# Patient Record
Sex: FEMALE | Race: WHITE | Employment: OTHER | URBAN - METROPOLITAN AREA
[De-identification: names, ages, dates, MRNs, and addresses within clinical notes are randomized per-mention and may not be internally consistent; named-entity substitution may affect disease eponyms.]

---

## 2018-01-12 NOTE — PROCEDURE NOTE: SURGICAL
"<p>Prior to commencing surgery patient identification, surgical procedure, site, and side were confirmed by Dr. Moreno Pierre. Following topical proparacaine anesthesia, the patient was positioned at the YAG laser, a contact lens coupled to the cornea of the right eye with methylcellulose and an axial posterior capsulotomy performed without complication using 3.2 Mj x 23.&nbsp;&nbsp;<span style=""text-align:justify;"">Attention was then turned to the left eye and a contact lens coupled to the cornea of the left eye with methylcellulose and an axial posterior capsulotomy performed without complication using 3.3 Mj x 24.  Excess methylcellulose was washed from the eye

## 2018-04-17 ENCOUNTER — CATARACT CONSULT (OUTPATIENT)
Dept: URBAN - METROPOLITAN AREA CLINIC 43 | Facility: CLINIC | Age: 68
End: 2018-04-17

## 2018-04-17 VITALS
SYSTOLIC BLOOD PRESSURE: 188 MMHG | HEART RATE: 72 BPM | DIASTOLIC BLOOD PRESSURE: 110 MMHG | HEIGHT: 55 IN | RESPIRATION RATE: 14 BRPM

## 2018-04-17 DIAGNOSIS — H35.30: ICD-10-CM

## 2018-04-17 DIAGNOSIS — E11.9: ICD-10-CM

## 2018-04-17 DIAGNOSIS — H25.813: ICD-10-CM

## 2018-04-17 DIAGNOSIS — H04.123: ICD-10-CM

## 2018-04-17 PROCEDURE — 92015 DETERMINE REFRACTIVE STATE: CPT

## 2018-04-17 PROCEDURE — 92004 COMPRE OPH EXAM NEW PT 1/>: CPT

## 2018-04-17 PROCEDURE — 92134 CPTRZ OPH DX IMG PST SGM RTA: CPT

## 2018-04-17 PROCEDURE — 92136TC INTERFEROMETRY - TECHNICAL COMPONENT

## 2018-04-17 ASSESSMENT — TONOMETRY
OD_IOP_MMHG: 18
OS_IOP_MMHG: 18

## 2018-04-17 ASSESSMENT — VISUAL ACUITY
OD_SC: 20/25+2
OD_BAT: 20/50
OD_SC: J6
OS_CC: J1
OS_SC: J6
OS_BAT: 20/50
OS_SC: 20/25
OD_CC: J1

## 2019-09-25 ENCOUNTER — ESTABLISHED COMPREHENSIVE EXAM (OUTPATIENT)
Dept: URBAN - METROPOLITAN AREA CLINIC 43 | Facility: CLINIC | Age: 69
End: 2019-09-25

## 2019-09-25 DIAGNOSIS — H35.30: ICD-10-CM

## 2019-09-25 DIAGNOSIS — H25.813: ICD-10-CM

## 2019-09-25 DIAGNOSIS — H04.123: ICD-10-CM

## 2019-09-25 DIAGNOSIS — E11.9: ICD-10-CM

## 2019-09-25 PROCEDURE — 92134 CPTRZ OPH DX IMG PST SGM RTA: CPT

## 2019-09-25 PROCEDURE — 92014 COMPRE OPH EXAM EST PT 1/>: CPT

## 2019-09-25 PROCEDURE — 92015 DETERMINE REFRACTIVE STATE: CPT

## 2019-09-25 ASSESSMENT — VISUAL ACUITY
OD_SC: J8
OD_CC: J1
OS_BAT: <20/400
OD_SC: 20/25+1
OD_BAT: <20/400
OS_CC: J1
OS_SC: J6
OS_SC: 20/20

## 2019-09-25 ASSESSMENT — TONOMETRY
OS_IOP_MMHG: 19
OD_IOP_MMHG: 19

## 2020-11-03 ENCOUNTER — ESTABLISHED PATIENT (OUTPATIENT)
Dept: URBAN - METROPOLITAN AREA CLINIC 43 | Facility: CLINIC | Age: 70
End: 2020-11-03

## 2020-11-03 DIAGNOSIS — E11.9: ICD-10-CM

## 2020-11-03 DIAGNOSIS — H25.813: ICD-10-CM

## 2020-11-03 DIAGNOSIS — H04.123: ICD-10-CM

## 2020-11-03 DIAGNOSIS — H35.30: ICD-10-CM

## 2020-11-03 PROCEDURE — 92015 DETERMINE REFRACTIVE STATE: CPT

## 2020-11-03 PROCEDURE — 92134 CPTRZ OPH DX IMG PST SGM RTA: CPT

## 2020-11-03 PROCEDURE — 99214 OFFICE O/P EST MOD 30 MIN: CPT

## 2020-11-03 ASSESSMENT — TONOMETRY
OD_IOP_MMHG: 20
OS_IOP_MMHG: 18

## 2020-11-03 ASSESSMENT — VISUAL ACUITY
OD_BAT: 20/50
OD_SC: 20/25
OD_RAM: 20/20
OS_CC: J1
OD_CC: J1
OD_SC: J8
OS_SC: J8
OS_BAT: 20/50
OS_SC: 20/40+2

## 2022-01-12 ENCOUNTER — CONSULTATION/EVALUATION (OUTPATIENT)
Dept: URBAN - METROPOLITAN AREA CLINIC 43 | Facility: CLINIC | Age: 72
End: 2022-01-12

## 2022-01-12 DIAGNOSIS — H25.812: ICD-10-CM

## 2022-01-12 DIAGNOSIS — H35.30: ICD-10-CM

## 2022-01-12 DIAGNOSIS — H25.811: ICD-10-CM

## 2022-01-12 DIAGNOSIS — E11.9: ICD-10-CM

## 2022-01-12 DIAGNOSIS — H04.123: ICD-10-CM

## 2022-01-12 PROCEDURE — 99213 OFFICE O/P EST LOW 20 MIN: CPT

## 2022-01-12 PROCEDURE — 92134 CPTRZ OPH DX IMG PST SGM RTA: CPT

## 2022-01-12 PROCEDURE — 92015 DETERMINE REFRACTIVE STATE: CPT

## 2022-01-12 ASSESSMENT — VISUAL ACUITY
OS_BAT: 20/50
OS_CC: J1
OD_SC: J8
OD_CC: J2
OD_SC: 20/30
OS_AM: 20/20
OS_SC: 20/30
OD_BAT: 20/50
OS_SC: J6
OD_RAM: 20/20

## 2022-01-12 ASSESSMENT — TONOMETRY
OD_IOP_MMHG: 16
OS_IOP_MMHG: 17

## 2022-05-21 NOTE — PATIENT DISCUSSION
Lab Results   Component Value Date    HGBA1C 6 5 (H) 03/30/2022       Recent Labs     05/20/22  1506 05/20/22  1800 05/20/22  2127 05/21/22  1059   POCGLU 131 130 138 212*       Blood Sugar Average: Last 72 hrs:  · (P) 160     · Metformin on hold while inpatient  · Continue insulin therapy  · Titrate insulin dose based on Accu-Cheks  · Avoid hypoglycemia  · Hypoglycemia protocol in place Recommended against surgery at this time given that patient has PCO and that may be contributing more to blurry vision than the Pucker.

## 2023-01-27 ENCOUNTER — COMPREHENSIVE EXAM (OUTPATIENT)
Dept: URBAN - METROPOLITAN AREA CLINIC 43 | Facility: CLINIC | Age: 73
End: 2023-01-27

## 2023-01-27 DIAGNOSIS — H52.4: ICD-10-CM

## 2023-01-27 DIAGNOSIS — H35.30: ICD-10-CM

## 2023-01-27 DIAGNOSIS — H04.123: ICD-10-CM

## 2023-01-27 DIAGNOSIS — E11.9: ICD-10-CM

## 2023-01-27 DIAGNOSIS — H25.813: ICD-10-CM

## 2023-01-27 PROCEDURE — 92015 DETERMINE REFRACTIVE STATE: CPT

## 2023-01-27 PROCEDURE — 92014 COMPRE OPH EXAM EST PT 1/>: CPT

## 2023-01-27 ASSESSMENT — VISUAL ACUITY
OD_SC: 20/25
OU_CC: J1
OD_CC: J1
OD_SC: J8
OS_SC: J6
OU_SC: J4
OU_SC: 20/20-2
OS_SC: 20/25-2
OS_CC: J1

## 2023-01-27 ASSESSMENT — TONOMETRY
OS_IOP_MMHG: 20
OD_IOP_MMHG: 20

## 2024-01-29 ENCOUNTER — COMPREHENSIVE EXAM (OUTPATIENT)
Dept: URBAN - METROPOLITAN AREA CLINIC 43 | Facility: CLINIC | Age: 74
End: 2024-01-29

## 2024-01-29 DIAGNOSIS — H52.4: ICD-10-CM

## 2024-01-29 DIAGNOSIS — E11.9: ICD-10-CM

## 2024-01-29 DIAGNOSIS — Z01.00: ICD-10-CM

## 2024-01-29 PROCEDURE — 92014 COMPRE OPH EXAM EST PT 1/>: CPT

## 2024-01-29 PROCEDURE — 92015 DETERMINE REFRACTIVE STATE: CPT

## 2024-01-29 ASSESSMENT — VISUAL ACUITY
OD_SC: 20/20
OU_SC: J6
OU_SC: 20/20
OS_SC: J6
OD_CC: J1
OD_SC: J8+
OS_SC: 20/20-1
OU_CC: J1
OS_CC: J1

## 2024-01-29 ASSESSMENT — TONOMETRY
OD_IOP_MMHG: 15
OS_IOP_MMHG: 16

## 2025-01-28 ENCOUNTER — COMPREHENSIVE EXAM (OUTPATIENT)
Age: 75
End: 2025-01-28

## 2025-01-28 DIAGNOSIS — Z01.00: ICD-10-CM

## 2025-01-28 DIAGNOSIS — H52.4: ICD-10-CM

## 2025-01-28 DIAGNOSIS — E11.9: ICD-10-CM

## 2025-01-28 PROCEDURE — 92015 DETERMINE REFRACTIVE STATE: CPT

## 2025-01-28 PROCEDURE — 92014 COMPRE OPH EXAM EST PT 1/>: CPT

## 2025-04-02 ENCOUNTER — APPOINTMENT (OUTPATIENT)
Dept: URBAN - METROPOLITAN AREA CLINIC 150 | Facility: CLINIC | Age: 75
Setting detail: DERMATOLOGY
End: 2025-04-02

## 2025-04-02 DIAGNOSIS — L82.1 OTHER SEBORRHEIC KERATOSIS: ICD-10-CM | Status: STABLE

## 2025-04-02 DIAGNOSIS — L57.0 ACTINIC KERATOSIS: ICD-10-CM | Status: INADEQUATELY CONTROLLED

## 2025-04-02 DIAGNOSIS — L81.4 OTHER MELANIN HYPERPIGMENTATION: ICD-10-CM | Status: STABLE

## 2025-04-02 PROCEDURE — ? COUNSELING

## 2025-04-02 PROCEDURE — ? TREATMENT REGIMEN

## 2025-04-02 PROCEDURE — 17000 DESTRUCT PREMALG LESION: CPT

## 2025-04-02 PROCEDURE — 99203 OFFICE O/P NEW LOW 30 MIN: CPT | Mod: 25

## 2025-04-02 PROCEDURE — ? LIQUID NITROGEN

## 2025-04-02 ASSESSMENT — LOCATION SIMPLE DESCRIPTION DERM: LOCATION SIMPLE: LEFT CHEEK

## 2025-04-02 ASSESSMENT — LOCATION ZONE DERM: LOCATION ZONE: FACE

## 2025-04-02 ASSESSMENT — LOCATION DETAILED DESCRIPTION DERM
LOCATION DETAILED: LEFT INFERIOR CENTRAL MALAR CHEEK
LOCATION DETAILED: LEFT SUPERIOR LATERAL MALAR CHEEK

## 2025-04-02 NOTE — PROCEDURE: LIQUID NITROGEN
Show Applicator Variable?: Yes
Render Note In Bullet Format When Appropriate: No
Post-Care Instructions: I reviewed with the patient in detail post-care instructions. Patient is to wear sun protection, and avoid picking at any of the treated lesions. Pt may apply Vaseline or Aquaphor to crusted or scabbing areas.
Duration Of Freeze Thaw-Cycle (Seconds): 3
Consent: The patient's consent was obtained including but not limited to risks of crusting, scabbing, blistering, scarring, darker or lighter pigmentary change, recurrence, incomplete removal and infection.
Number Of Freeze-Thaw Cycles: 1 freeze-thaw cycle
Detail Level: Detailed